# Patient Record
(demographics unavailable — no encounter records)

---

## 2025-04-06 NOTE — REASON FOR VISIT
[FreeTextEntry1] : SELF, DIYA, PREV [facility] [Follow-Up: _____] : a [unfilled] follow-up visit

## 2025-04-06 NOTE — HISTORY OF PRESENT ILLNESS
[FreeTextEntry1] : Patient is scheduled for surgery at the [facility] W 4/22/25 She has 25-year-old ruptured gel implants or unknown type of implants previous op notes difficult to obtain she was operated on by Yaya Marte in [place]. We discussed all the indications expectations and goals of the surgery all questions were answered.  We reviewed photographs.  She wants the broken implants removed total capsulectomies.  She most likely wants brand-new breast implants placed which would be a cosmetic charge.  She not sure about the breast lift which was carefully explained to her with diagrams and drawings.  Potential risks include bleeding infection poor scarring unhappiness with result asymmetry and possible need for additional procedures to correct implant placement or the breast lift incisions for nipple areolar positions.  She has to think about her options more get all the fees from the office and make a final decision.  The implants will have to be ordered the heart to be in the same size as 3-4 100 or 425 since the current size is unknown.  He would be high profile gel.  Total time spent with the patient encounter excluding teaching or teaching or procedure time is 32 minutes.

## 2025-04-24 NOTE — REASON FOR VISIT
[Procedure: _________] : a [unfilled] procedure visit [FreeTextEntry1] : Placement of bilateral breast silicon gel breast implants

## 2025-04-26 NOTE — REASON FOR VISIT
[Post Op: _________] : a [unfilled] post op visit [FreeTextEntry1] : KEANE SELF [Family Member] : family member

## 2025-04-26 NOTE — HISTORY OF PRESENT ILLNESS
[FreeTextEntry1] : Patient doing well postop day #3 status post removal of bilateral ruptured silicone gel breast implants placement of new cosmetic silicone gel breast implants.  Drains putting out less than 30 cc/day.  Wounds are intact incisions close no evidence infection cellulitis hematoma bilateral Saturnino-Ferreira drains removed without difficulty new bandages applied wound care and activity level discussed follow-up again next Thursday for wound check.

## 2025-05-04 NOTE — HISTORY OF PRESENT ILLNESS
[FreeTextEntry1] : Patient is postop day 9 from removal of bilateral ruptured silicone gel breast implants total capsulectomy and placement of new implants and revision of internal pockets.  Doing nicely incisions intact implants in good position some fluid seroma detected no hematoma.  No evidence of infection or cellulitis wound care discussed follow-up again in 4 weeks.

## 2025-06-08 NOTE — REASON FOR VISIT
[FreeTextEntry1] : DIYA, SELF [Post Op: _________] : a [unfilled] post op visit [TextEntry] : Patient is almost 2 months status post removal of old ruptured breast implants placed with new prepectoral plane with recreation inframammary lateral mammary creases.  She is happy the results implants in good position still feels some pain especially with leaning over most likely due to heavy Prolene permanent sutures to the rib periosteum bilaterally.  Otherwise doing very nicely follow-up with me on an annual basis for breast implant check and ultrasounds to start 3 years.